# Patient Record
(demographics unavailable — no encounter records)

---

## 2025-07-17 NOTE — ASSESSMENT
[FreeTextEntry1] : wax- Rx:  Debrox 5 gtts BID to both ears until f/u next week Pt required restraints by family/caretakers to help in debriding ears  Consider EUA Pt scheduled for Hernia sugery-consider EUA of ears at that time  f/u here 7-10 days fro further attempts at ear cleaning

## 2025-07-17 NOTE — PROCEDURE
[FreeTextEntry3] : Procedure - Cerumen Removal. Indication - Obstructing visualization of TM After informed verbal consent is obtained, cerumen was removed from the both         ear canal(s)-partial with a curette and suction.  Normal appearing canal(s) following removal.

## 2025-07-17 NOTE — PHYSICAL EXAM
[Normal] : mucosa is normal [Midline] : trachea located in midline position [de-identified] : bilat wax

## 2025-07-28 NOTE — PHYSICAL EXAM
[Completely disabled. Cannot carry on any self care. Totally confined to bed or chair] : Status 4- Completely disabled. Cannot carry on any self care. Totally confined to bed or chair [Sclera] : the sclera and conjunctiva were normal [Outer Ear] : the ears and nose were normal in appearance [Neck Appearance] : the appearance of the neck was normal [] : no respiratory distress [Respiration, Rhythm And Depth] : normal respiratory rhythm and effort [Exaggerated Use Of Accessory Muscles For Inspiration] : no accessory muscle use [Auscultation Breath Sounds / Voice Sounds] : lungs were clear to auscultation bilaterally [Heart Rate And Rhythm] : heart rate was normal and rhythm regular [Examination Of The Chest] : the chest was normal in appearance [Chest Visual Inspection Thoracic Asymmetry] : no chest asymmetry [Diminished Respiratory Excursion] : normal chest expansion [2+] : left 2+ [Breast Appearance] : normal in appearance [Breast Palpation Mass] : no palpable masses [Bowel Sounds] : normal bowel sounds [Abdomen Soft] : soft [Abdomen Tenderness] : non-tender [Cervical Lymph Nodes Enlarged Posterior Bilaterally] : posterior cervical [Cervical Lymph Nodes Enlarged Anterior Bilaterally] : anterior cervical [Supraclavicular Lymph Nodes Enlarged Bilaterally] : supraclavicular [No CVA Tenderness] : no ~M costovertebral angle tenderness [No Spinal Tenderness] : no spinal tenderness [Involuntary Movements] : no involuntary movements were seen [Skin Color & Pigmentation] : normal skin color and pigmentation [Oriented To Time, Place, And Person] : oriented to person, place, and time [FreeTextEntry1] : Intellectually delayed

## 2025-07-30 NOTE — PROCEDURE
[FreeTextEntry3] : Procedure - Cerumen Removal. Indication - Obstructing visualization of TM After informed verbal consent is obtained, cerumen was removed from the both         ear canal(s) with a curette and suction.  Normal appearing canal(s) following removal.

## 2025-07-30 NOTE — ASSESSMENT
[FreeTextEntry1] : wax-still sl residual left in ears Rx:  Debrox was prescribed and  is to be placed in both ears on a routine basis to keep them free of wax. Routine debridement suggested to keep the ears free of wax. f/u 3 wks

## 2025-07-30 NOTE — REASON FOR VISIT
[Subsequent Evaluation] : a subsequent evaluation for [Hearing Loss] : hearing loss [Family Member] : family member

## 2025-07-30 NOTE — PHYSICAL EXAM
[Normal] : mucosa is normal [Midline] : trachea located in midline position [de-identified] : bilat wax